# Patient Record
Sex: FEMALE | URBAN - METROPOLITAN AREA
[De-identification: names, ages, dates, MRNs, and addresses within clinical notes are randomized per-mention and may not be internally consistent; named-entity substitution may affect disease eponyms.]

---

## 2017-07-05 ENCOUNTER — APPOINTMENT (RX ONLY)
Age: 22
Setting detail: DERMATOLOGY
End: 2017-07-05

## 2017-07-05 DIAGNOSIS — L70.0 ACNE VULGARIS: ICD-10-CM

## 2017-07-05 PROCEDURE — ? DIAGNOSIS COMMENT

## 2017-07-05 PROCEDURE — 99203 OFFICE O/P NEW LOW 30 MIN: CPT

## 2017-07-05 PROCEDURE — ? PRESCRIPTION

## 2017-07-05 PROCEDURE — ? COUNSELING

## 2017-07-05 RX ORDER — SPIRONOLACTONE 100 MG/1
TABLET, FILM COATED ORAL
Qty: 30 | Refills: 3 | Status: ERX | COMMUNITY
Start: 2017-07-05

## 2017-07-05 RX ADMIN — SPIRONOLACTONE: 100 TABLET, FILM COATED ORAL at 23:38

## 2017-07-05 NOTE — PROCEDURE: DIAGNOSIS COMMENT
Comment: Acne treatment discussed at length. Because she was on minocycline for over 2 years, will avoid additional oral antibiotics at this time. We will start spironolactone. We also discussed a course of isotretinoin (her sister took it). She is going back to college in [place] in 2 months. She will f/u with a dermatologist there in the Fall if not improved on spironolactone to discuss a possible course of isotretinoin, especially if the painful cysts are not improved. Will hold off on additional topical medication at this time, since it has been drying and not very helpful in the past. She has used retinoids in the past. She was given samples of Cetaphil acne cleanser and moisturizer. Side effects of isotretinoin were briefly discussed.
Detail Level: Zone

## 2017-10-26 RX ORDER — SPIRONOLACTONE 100 MG/1
TABLET, FILM COATED ORAL
Qty: 30 | Refills: 3 | Status: ERX